# Patient Record
Sex: FEMALE | Race: BLACK OR AFRICAN AMERICAN | Employment: UNEMPLOYED | URBAN - METROPOLITAN AREA
[De-identification: names, ages, dates, MRNs, and addresses within clinical notes are randomized per-mention and may not be internally consistent; named-entity substitution may affect disease eponyms.]

---

## 2021-10-21 RX ORDER — ATORVASTATIN CALCIUM 20 MG/1
20 TABLET, FILM COATED ORAL DAILY
COMMUNITY

## 2021-10-21 NOTE — PROGRESS NOTES
Name_______________________________________Printed:____________________  Date and time of surgery________10/22/21 1300________________Ar[place] Time:____[facility]   1. The instructions given regarding when and if a patient needs to stop oral intake prior to surgery varies. Follow the specific instructions you were given                  __x_Nothing to eat or to drink after Midnight the night before.                   ____Carbo loading or ERAS instructions will be given to select patients-if you have been given those instructions -please do the following                           The evening before your surgery after dinner before midnight drink 40 ounces of gatorade. If you are diabetic use sugar free. The morning of surgery drink 40 ounces of water. This needs to be finished 3 hours prior to your surgery start time. 2. Take the following pills with a small sip of water on the morning of surgery____________xanax prn_______________________________________                  Do not take blood pressure medications ending in pril or sartan the kimberly prior to surgery or the morning of surgery_   3. Aspirin, Ibuprofen, Advil, Naproxen, Vitamin E and other Anti-inflammatory products and supplements should be stopped for 5 -7days before surgery or as directed by your physician. 4. Check with your Doctor regarding stopping Plavix, Coumadin,Eliquis, Lovenox,Effient,Pradaxa,Xarelto, Fragmin or other blood thinners and follow their instructions. 5. Do not smoke, and do not drink any alcoholic beverages 24 hours prior to surgery. This includes NA Beer. Refrain from the usage of any recreational drugs. 6. You may brush your teeth and gargle the morning of surgery. DO NOT SWALLOW WATER   7. You MUST make arrangements for a responsible adult to stay on site while you are here and take you home after your surgery. You will not be allowed to leave alone or drive yourself home.   It is strongly suggested someone stay with additional information:  Innofidei/patient-eprep              20.During flu season no children under the age of 15 are permitted in the hospital for the safety of all patients. 21. If you take a long acting insulin in the evening only  take half of your usual  dose the night  before your procedure              22. If you use a c-pap please bring DOS if staying overnight,             23.For your convenience [facility] has a pharmacy on site to fill your prescriptions. 24. If you use oxygen and have a portable tank please bring it  with you the DOS             25. Bring a complete list of all your medications with name and dose include any supplements. 26. Other__________________________________________   *Please call pre admission testing if you any further questions   Jax Wiseman   [address]    [place] [address] [place]  [phone]   [facility] [address] MONA WILEY   [phone]           COVID TESTING    ___ Covid test to be done 3-5 days prior to scheduled surgery -patient aware they are REQUIRED to bring a copy of the negative result DOS-if they receive a positive result to notify their surgeon         If known - indicate where patient is getting covid test done ___________________________________________________________    ___ [facility] - DOS    __x_ Other_____+covid 8/7/21 c.e.______________________________      Kayleen Blind POLICY(subject to change)    There is a one visitor policy at [facility] for all surgeries and endoscopies. Whether the visitor can stay or will be asked to wait in the car will depend on the current policy and if social distancing can be maintained. The policy is subject to change at any time. Please make sure the visitor has a cell phone that is on,charged and able to accept calls, as this may be the way that the staff communicates with them. Pain management is NO VISITOR policyThe patients ride is expected to remain in the car with a cell phone for communication. If the ride is leaving the hospital grounds please make sure they are back in time for pickup. Have the patient inform the staff on arrival what their rides plans are while the patient is in the facility. At the MAIN there is one visitor allowed. Please note that the visitor policy is subject to change. All above information reviewed with patient in person or by phone. Patient verbalizes understanding. All questions and concerns addressed.                                                                                                  Patient/Rep_______pt_____________                                                                                                                                    PRE OP INSTRUCTIONS

## 2021-10-22 ENCOUNTER — HOSPITAL ENCOUNTER (OUTPATIENT)
Age: 51
Setting detail: OUTPATIENT SURGERY
Discharge: HOME | End: 2021-10-22
Attending: PODIATRIST | Admitting: PODIATRIST
Payer: MEDICAID

## 2021-10-22 ENCOUNTER — ANESTHESIA (OUTPATIENT)
Dept: OPERATING ROOM | Age: 51
End: 2021-10-22
Payer: MEDICAID

## 2021-10-22 ENCOUNTER — ANESTHESIA EVENT (OUTPATIENT)
Dept: OPERATING ROOM | Age: 51
End: 2021-10-22
Payer: MEDICAID

## 2021-10-22 VITALS
HEART RATE: 81 BPM | RESPIRATION RATE: 16 BRPM | DIASTOLIC BLOOD PRESSURE: 85 MMHG | HEIGHT: 67 IN | WEIGHT: 180 LBS | OXYGEN SATURATION: 100 % | TEMPERATURE: 96.4 F | SYSTOLIC BLOOD PRESSURE: 142 MMHG | BODY MASS INDEX: 28.25 KG/M2

## 2021-10-22 VITALS
RESPIRATION RATE: 20 BRPM | OXYGEN SATURATION: 99 % | SYSTOLIC BLOOD PRESSURE: 111 MMHG | DIASTOLIC BLOOD PRESSURE: 78 MMHG

## 2021-10-22 DIAGNOSIS — G89.18 POST-OPERATIVE PAIN: Primary | ICD-10-CM

## 2021-10-22 LAB
GLUCOSE BLD-MCNC: 135 MG/DL (ref 70–99)
HCG(URINE) PREGNANCY TEST: NEGATIVE
PERFORMED ON: ABNORMAL

## 2021-10-22 PROCEDURE — 7100000000 HC PACU RECOVERY - FIRST 15 MIN: Performed by: PODIATRIST

## 2021-10-22 PROCEDURE — 3600000014 HC SURGERY LEVEL 4 ADDTL 15MIN: Performed by: PODIATRIST

## 2021-10-22 PROCEDURE — 7100000001 HC PACU RECOVERY - ADDTL 15 MIN: Performed by: PODIATRIST

## 2021-10-22 PROCEDURE — 2709999900 HC NON-CHARGEABLE SUPPLY: Performed by: PODIATRIST

## 2021-10-22 PROCEDURE — 3600000013 HC SURGERY LEVEL 3 ADDTL 15MIN: Performed by: PODIATRIST

## 2021-10-22 PROCEDURE — 3600000003 HC SURGERY LEVEL 3 BASE: Performed by: PODIATRIST

## 2021-10-22 PROCEDURE — C1713 ANCHOR/SCREW BN/BN,TIS/BN: HCPCS | Performed by: PODIATRIST

## 2021-10-22 PROCEDURE — 2500000003 HC RX 250 WO HCPCS: Performed by: REGISTERED NURSE

## 2021-10-22 PROCEDURE — 6360000002 HC RX W HCPCS: Performed by: REGISTERED NURSE

## 2021-10-22 PROCEDURE — 7100000010 HC PHASE II RECOVERY - FIRST 15 MIN: Performed by: PODIATRIST

## 2021-10-22 PROCEDURE — 7100000011 HC PHASE II RECOVERY - ADDTL 15 MIN: Performed by: PODIATRIST

## 2021-10-22 PROCEDURE — 84703 CHORIONIC GONADOTROPIN ASSAY: CPT

## 2021-10-22 PROCEDURE — 6360000002 HC RX W HCPCS: Performed by: PODIATRIST

## 2021-10-22 PROCEDURE — 3700000001 HC ADD 15 MINUTES (ANESTHESIA): Performed by: PODIATRIST

## 2021-10-22 PROCEDURE — 2580000003 HC RX 258: Performed by: REGISTERED NURSE

## 2021-10-22 PROCEDURE — 3700000000 HC ANESTHESIA ATTENDED CARE: Performed by: PODIATRIST

## 2021-10-22 PROCEDURE — 2500000003 HC RX 250 WO HCPCS: Performed by: PODIATRIST

## 2021-10-22 PROCEDURE — 2580000003 HC RX 258: Performed by: PODIATRIST

## 2021-10-22 PROCEDURE — 3600000004 HC SURGERY LEVEL 4 BASE: Performed by: PODIATRIST

## 2021-10-22 DEVICE — C WIRE FIX L5IN DIA0.045IN SPADE TIP FOR SM BNE TRAUM: Type: IMPLANTABLE DEVICE | Site: FOOT | Status: FUNCTIONAL

## 2021-10-22 RX ORDER — MIDAZOLAM HYDROCHLORIDE 1 MG/ML
INJECTION INTRAMUSCULAR; INTRAVENOUS PRN
Status: DISCONTINUED | OUTPATIENT
Start: 2021-10-22 | End: 2021-10-22 | Stop reason: SDUPTHER

## 2021-10-22 RX ORDER — ACETAMINOPHEN 650 MG
TABLET, EXTENDED RELEASE ORAL
Status: COMPLETED | OUTPATIENT
Start: 2021-10-22 | End: 2021-10-22

## 2021-10-22 RX ORDER — ONDANSETRON 2 MG/ML
4 INJECTION INTRAMUSCULAR; INTRAVENOUS
Status: DISCONTINUED | OUTPATIENT
Start: 2021-10-22 | End: 2021-10-22 | Stop reason: HOSPADM

## 2021-10-22 RX ORDER — LABETALOL HYDROCHLORIDE 5 MG/ML
5 INJECTION, SOLUTION INTRAVENOUS EVERY 10 MIN PRN
Status: DISCONTINUED | OUTPATIENT
Start: 2021-10-22 | End: 2021-10-22 | Stop reason: HOSPADM

## 2021-10-22 RX ORDER — BUPIVACAINE HYDROCHLORIDE 5 MG/ML
INJECTION, SOLUTION EPIDURAL; INTRACAUDAL
Status: COMPLETED | OUTPATIENT
Start: 2021-10-22 | End: 2021-10-22

## 2021-10-22 RX ORDER — ONDANSETRON 2 MG/ML
INJECTION INTRAMUSCULAR; INTRAVENOUS PRN
Status: DISCONTINUED | OUTPATIENT
Start: 2021-10-22 | End: 2021-10-22 | Stop reason: SDUPTHER

## 2021-10-22 RX ORDER — LIDOCAINE HYDROCHLORIDE 10 MG/ML
1 INJECTION, SOLUTION EPIDURAL; INFILTRATION; INTRACAUDAL; PERINEURAL
Status: CANCELLED | OUTPATIENT
Start: 2021-10-22 | End: 2021-10-22

## 2021-10-22 RX ORDER — PROPOFOL 10 MG/ML
INJECTION, EMULSION INTRAVENOUS PRN
Status: DISCONTINUED | OUTPATIENT
Start: 2021-10-22 | End: 2021-10-22 | Stop reason: SDUPTHER

## 2021-10-22 RX ORDER — SODIUM CHLORIDE, SODIUM LACTATE, POTASSIUM CHLORIDE, CALCIUM CHLORIDE 600; 310; 30; 20 MG/100ML; MG/100ML; MG/100ML; MG/100ML
INJECTION, SOLUTION INTRAVENOUS CONTINUOUS
Status: CANCELLED | OUTPATIENT
Start: 2021-10-22

## 2021-10-22 RX ORDER — SODIUM CHLORIDE 0.9 % (FLUSH) 0.9 %
10 SYRINGE (ML) INJECTION EVERY 12 HOURS SCHEDULED
Status: CANCELLED | OUTPATIENT
Start: 2021-10-22

## 2021-10-22 RX ORDER — FENTANYL CITRATE 50 UG/ML
INJECTION, SOLUTION INTRAMUSCULAR; INTRAVENOUS PRN
Status: DISCONTINUED | OUTPATIENT
Start: 2021-10-22 | End: 2021-10-22 | Stop reason: SDUPTHER

## 2021-10-22 RX ORDER — SODIUM CHLORIDE 9 MG/ML
INJECTION, SOLUTION INTRAVENOUS CONTINUOUS
Status: DISCONTINUED | OUTPATIENT
Start: 2021-10-22 | End: 2021-10-22 | Stop reason: HOSPADM

## 2021-10-22 RX ORDER — OXYCODONE HYDROCHLORIDE AND ACETAMINOPHEN 5; 325 MG/1; MG/1
1 TABLET ORAL EVERY 4 HOURS PRN
Qty: 42 TABLET | Refills: 0 | Status: SHIPPED | OUTPATIENT
Start: 2021-10-22 | End: 2021-10-29

## 2021-10-22 RX ORDER — PROMETHAZINE HYDROCHLORIDE 25 MG/1
25 TABLET ORAL EVERY 6 HOURS PRN
Qty: 28 TABLET | Refills: 0 | Status: SHIPPED | OUTPATIENT
Start: 2021-10-22 | End: 2021-10-29

## 2021-10-22 RX ORDER — PROPOFOL 10 MG/ML
INJECTION, EMULSION INTRAVENOUS CONTINUOUS PRN
Status: DISCONTINUED | OUTPATIENT
Start: 2021-10-22 | End: 2021-10-22 | Stop reason: SDUPTHER

## 2021-10-22 RX ORDER — LIDOCAINE HYDROCHLORIDE 20 MG/ML
INJECTION, SOLUTION EPIDURAL; INFILTRATION; INTRACAUDAL; PERINEURAL PRN
Status: DISCONTINUED | OUTPATIENT
Start: 2021-10-22 | End: 2021-10-22 | Stop reason: SDUPTHER

## 2021-10-22 RX ORDER — MEPERIDINE HYDROCHLORIDE 25 MG/ML
12.5 INJECTION INTRAMUSCULAR; INTRAVENOUS; SUBCUTANEOUS EVERY 5 MIN PRN
Status: DISCONTINUED | OUTPATIENT
Start: 2021-10-22 | End: 2021-10-22 | Stop reason: HOSPADM

## 2021-10-22 RX ORDER — SODIUM CHLORIDE 9 MG/ML
25 INJECTION, SOLUTION INTRAVENOUS PRN
Status: CANCELLED | OUTPATIENT
Start: 2021-10-22

## 2021-10-22 RX ORDER — SODIUM CHLORIDE 0.9 % (FLUSH) 0.9 %
10 SYRINGE (ML) INJECTION PRN
Status: CANCELLED | OUTPATIENT
Start: 2021-10-22

## 2021-10-22 RX ORDER — OXYCODONE HYDROCHLORIDE AND ACETAMINOPHEN 5; 325 MG/1; MG/1
1 TABLET ORAL
Status: DISCONTINUED | OUTPATIENT
Start: 2021-10-22 | End: 2021-10-22 | Stop reason: HOSPADM

## 2021-10-22 RX ORDER — HYDROMORPHONE HCL 110MG/55ML
0.5 PATIENT CONTROLLED ANALGESIA SYRINGE INTRAVENOUS EVERY 5 MIN PRN
Status: DISCONTINUED | OUTPATIENT
Start: 2021-10-22 | End: 2021-10-22 | Stop reason: HOSPADM

## 2021-10-22 RX ORDER — HYDRALAZINE HYDROCHLORIDE 20 MG/ML
5 INJECTION INTRAMUSCULAR; INTRAVENOUS EVERY 10 MIN PRN
Status: DISCONTINUED | OUTPATIENT
Start: 2021-10-22 | End: 2021-10-22 | Stop reason: HOSPADM

## 2021-10-22 RX ORDER — LIDOCAINE HYDROCHLORIDE 10 MG/ML
0.5 INJECTION, SOLUTION EPIDURAL; INFILTRATION; INTRACAUDAL; PERINEURAL ONCE
Status: DISCONTINUED | OUTPATIENT
Start: 2021-10-22 | End: 2021-10-22 | Stop reason: HOSPADM

## 2021-10-22 RX ORDER — LIDOCAINE HYDROCHLORIDE 10 MG/ML
INJECTION, SOLUTION EPIDURAL; INFILTRATION; INTRACAUDAL; PERINEURAL
Status: COMPLETED | OUTPATIENT
Start: 2021-10-22 | End: 2021-10-22

## 2021-10-22 RX ORDER — SODIUM CHLORIDE 9 MG/ML
INJECTION, SOLUTION INTRAVENOUS CONTINUOUS PRN
Status: DISCONTINUED | OUTPATIENT
Start: 2021-10-22 | End: 2021-10-22 | Stop reason: SDUPTHER

## 2021-10-22 RX ADMIN — MIDAZOLAM 2 MG: 1 INJECTION INTRAMUSCULAR; INTRAVENOUS at 12:52

## 2021-10-22 RX ADMIN — ONDANSETRON 4 MG: 2 INJECTION INTRAMUSCULAR; INTRAVENOUS at 13:38

## 2021-10-22 RX ADMIN — SODIUM CHLORIDE: 9 INJECTION, SOLUTION INTRAVENOUS at 12:53

## 2021-10-22 RX ADMIN — LIDOCAINE HYDROCHLORIDE 80 MG: 20 INJECTION, SOLUTION EPIDURAL; INFILTRATION; INTRACAUDAL; PERINEURAL at 12:57

## 2021-10-22 RX ADMIN — FENTANYL CITRATE 25 MCG: 50 INJECTION, SOLUTION INTRAMUSCULAR; INTRAVENOUS at 13:09

## 2021-10-22 RX ADMIN — PROPOFOL 80 MG: 10 INJECTION, EMULSION INTRAVENOUS at 12:57

## 2021-10-22 RX ADMIN — PROPOFOL 120 MCG/KG/MIN: 10 INJECTION, EMULSION INTRAVENOUS at 12:57

## 2021-10-22 RX ADMIN — CEFAZOLIN 2000 MG: 10 INJECTION, POWDER, FOR SOLUTION INTRAVENOUS at 12:47

## 2021-10-22 RX ADMIN — FENTANYL CITRATE 25 MCG: 50 INJECTION, SOLUTION INTRAMUSCULAR; INTRAVENOUS at 13:14

## 2021-10-22 RX ADMIN — SODIUM CHLORIDE: 9 INJECTION, SOLUTION INTRAVENOUS at 12:32

## 2021-10-22 ASSESSMENT — PULMONARY FUNCTION TESTS
PIF_VALUE: 0
PIF_VALUE: 1
PIF_VALUE: 0
PIF_VALUE: 1
PIF_VALUE: 0

## 2021-10-22 ASSESSMENT — PAIN - FUNCTIONAL ASSESSMENT
PAIN_FUNCTIONAL_ASSESSMENT: 0-10
PAIN_FUNCTIONAL_ASSESSMENT: PREVENTS OR INTERFERES SOME ACTIVE ACTIVITIES AND ADLS

## 2021-10-22 ASSESSMENT — PAIN DESCRIPTION - DESCRIPTORS: DESCRIPTORS: ACHING;BURNING

## 2021-10-22 ASSESSMENT — LIFESTYLE VARIABLES: SMOKING_STATUS: 0

## 2021-10-22 NOTE — PROGRESS NOTES
Pt arrived from OR, report from crna/rn. Pt had ORIF left 4th toe, dressing/ace wrap CDI. Pt awake and alert upon arrival, VSS. Respirations even unlabored and adequate on room air. Left toes warm, kirstin color, less 3 second cap refill. Pt has no complaints of pain.

## 2021-10-22 NOTE — PROGRESS NOTES
CLINICAL PHARMACY NOTE: MEDS TO BEDS    Total # of Prescriptions Filled: 2   The following medications were delivered to the patient:  · Percocet 5-325 mg  · Promethazine 25 mg    Additional Documentation:    Delivered to Patient friend Araecly Osman =signed  Max Lea CPhT

## 2021-10-22 NOTE — BRIEF OP NOTE
Brief Postoperative Note      Patient: Terry Singletary  YOB: 1970  MRN: [id number]    Date of Procedure: 10/22/2021    Pre-Op Diagnosis: S92.512D  DISPLACED FRACTURE 4TH PROXIMAL PHALANX, LEFT    Post-Op Diagnosis: Same       Procedure(s):  OPEN REDUCTION INTERNAL FIXATION LEFT FOURTH TOE    Surgeon(s):  Sedrick Grossman DPM    Assistant:  Leisa Page DPM, PGY-2    Anesthesia: Monitor Anesthesia Care    Injectables: 10cc of 1% lidocaine plain preoperatively  10cc of 0.5% marcaine plain intra-operatively    Hemostasis: Pneumatic ankle tourniquet at 250mmHg for 46 minutes    Materials: 0.045 inch K-wire  3-0 vicryl, 5-0 vicryl    Estimated Blood Loss (mL): Minimal    Complications: None    Specimens:   * No specimens in log *    Implants:  Implant Name Type Inv.  Item Serial No.  Lot No. LRB No. Used Action   C WIRE FIX L5IN DIA0.045IN SPADE TIP FOR SM BNE TRAUM  C WIRE FIX L5IN DIA0.045IN SPADE TIP FOR SM BNE TRAUM  [facility] [employer] [facility] HARR[employer]WD [id number] Left 1 Implanted         Drains: * No LDAs found *    Findings: See op report    Electronically signed by Leisa Page DPM on 10/22/2021 at 2:04 PM

## 2021-10-22 NOTE — H&P
Department of Podiatric Surgery  History and Physical      HISTORY OF PRESENT ILLNESS:      The patient is a 45 y.o. female presents today for operative correction of displaced fracture of 4th proximal phalanx, left. Past Medical History:        Diagnosis Date    Anxiety     Depression     Diabetes mellitus ([address]     BORDERLINE    Hypertension        Past Surgical History:        Procedure Laterality Date    COLONOSCOPY      BENIGN POLYPS X 2    CYST REMOVAL      SHOULDER SURGERY      UPPER GASTROINTESTINAL ENDOSCOPY                   Medications Prior to Admission:   Medications Prior to Admission: METFORMIN HCL PO, Take by mouth  CHLORTHALIDONE PO, Take by mouth  atorvastatin (LIPITOR) 20 MG tablet, Take 20 mg by mouth daily  ondansetron (ZOFRAN ODT) 4 MG disintegrating tablet, Take 1-2 tablets by mouth every 12 hours as needed for Nausea. cephALEXin (KEFLEX) 500 MG capsule, Take 1 capsule by mouth 4 times daily. zolpidem (AMBIEN) 5 MG tablet, Take 5 mg by mouth nightly as needed. niacin 500 MG CR capsule, Take 500 mg by mouth nightly. Omega 3 1000 MG CAPS, Take  by mouth daily. vitamin D (ERGOCALCIFEROL) 46458 UNITS CAPS capsule, Take 50,000 Units by mouth once a week. alprazolam (XANAX) 0.5 MG tablet, Take 0.5 mg by mouth 3 times daily as needed. therapeutic multivitamin-minerals (THERAGRAN-M) tablet, Take 1 tablet by mouth daily. Allergies:  Lisinopril    Social History:   TOBACCO:   reports that she has never smoked. She has never used smokeless tobacco.  ETOH:   reports current alcohol use. DRUGS:   reports previous drug use. Family History:   History reviewed. No pertinent family history.     REVIEW OF SYSTEMS:  CONSTITUTIONAL:  negative  RESPIRATORY:  negative  CARDIOVASCULAR:  negative  GASTROINTESTINAL:  negative  MUSCULOSKELETAL:  negative  NEUROLOGICAL:  negative    PHYSICAL EXAM:  VITALS:  /81   Pulse 88   Temp 98.1 °F (36.7 °C) (Temporal)   Resp 16   Ht 5' 7.25\" (1.708 m)   Wt 180 lb (81.6 kg)   LMP 02/21/2021   SpO2 99%   Breastfeeding No   BMI 27.98 kg/m²   CONSTITUTIONAL:  awake, alert, cooperative, no apparent distress, and appears stated age  EYES:  pupils equal, round and reactive to light, extra ocular muscles intact, sclera clear, conjunctiva normal  ENT:  normocepalic, without obvious abnormality  NECK:  Supple, symmetrical, trachea midline, no adenopathy, thyroid symmetric, not enlarged and no tenderness, skin normal  LUNGS:  No increased work of breathing, good air exchange, clear to auscultation bilaterally, no crackles or wheezing  CARDIOVASCULAR:  Normal apical impulse, regular rate and rhythm  ABDOMEN:  normal bowel sounds, soft, non-distended, non-tender, no masses palpated, no hepatosplenomegally      ASSESSMENT AND PLAN:  1.  Displaced fracture of the 4th proximal phalanx, left    - Plan for open reduction with internal fixation of the left 4th toe      Mortimer Poland, DPM  [phone]

## 2021-10-22 NOTE — PROGRESS NOTES
Surgical shoe placed on left foot. Pt assisted with dressing and ambulation into wheel chair. Pt left foot dressing clean dry and intact. Left toes warm, and less 3 second cap refill. Pt has no complaints of pain or nausea, tolerating PO well. Pt discharged via wheel chair in stable condition to daughter in law to be transported home.

## 2021-10-22 NOTE — ANESTHESIA PRE PROCEDURE
Department of Anesthesiology  Preprocedure Note       Name:  Aleksander Barbour   Age:  45 y.o.  :  1970                                          MRN:  [id number]         Date:  10/22/2021      Surgeon: Joy Baltazar):  Beth Paez DPM    Procedure: Procedure(s):  OPEN REDUCTION INTERNAL FIXATION LEFT FOURTH TOE    Medications prior to admission:   Prior to Admission medications    Medication Sig Start Date End Date Taking? Authorizing Provider   METFORMIN HCL PO Take by mouth   Yes Historical Provider, MD   CHLORTHALIDONE PO Take by mouth   Yes Historical Provider, MD   atorvastatin (LIPITOR) 20 MG tablet Take 20 mg by mouth daily   Yes Historical Provider, MD   ondansetron (ZOFRAN ODT) 4 MG disintegrating tablet Take 1-2 tablets by mouth every 12 hours as needed for Nausea. 14   [facility] BLESSING Goodson PA-C   cephALEXin (KEFLEX) 500 MG capsule Take 1 capsule by mouth 4 times daily. 14   ÁNGELA Wahl   zolpidem (AMBIEN) 5 MG tablet Take 5 mg by mouth nightly as needed. Historical Provider, MD   niacin 500 MG CR capsule Take 500 mg by mouth nightly. Historical Provider, MD   Omega 3 1000 MG CAPS Take  by mouth daily. Historical Provider, MD   vitamin D (ERGOCALCIFEROL) 40079 UNITS CAPS capsule Take 50,000 Units by mouth once a week. Historical Provider, MD   alprazolam Tom Files) 0.5 MG tablet Take 0.5 mg by mouth 3 times daily as needed. Historical Provider, MD   therapeutic multivitamin-minerals (THERAGRAN-M) tablet Take 1 tablet by mouth daily.       Historical Provider, MD       Current medications:    Current Facility-Administered Medications   Medication Dose Route Frequency Provider Last Rate Last Admin    lidocaine PF 1 % injection 0.5 mL  0.5 mL SubCUTAneous Once Beth Paez DPM        0.9 % sodium chloride infusion   IntraVENous Continuous Beth Paez DPM        ceFAZolin (ANCEF) 2000 mg in dextrose 5 % 50 mL IVPB  2,000 mg IntraVENous On Call to [address], [place]  meperidine (DEMEROL) injection 12.5 mg  12.5 mg IntraVENous Q5 Min PRN Timbo Botello MD        HYDROmorphone (DILAUDID) injection 0.5 mg  0.5 mg IntraVENous Q5 Min PRN Timbo Botello MD        oxyCODONE-acetaminophen (PERCOCET) 5-325 MG per tablet 1 tablet  1 tablet Oral Once PRN Timbo Botello MD        ondansetron [facility]) injection 4 mg  4 mg IntraVENous Once PRN Timbo Botello MD        labetalol (NORMODYNE;TRANDATE) injection 5 mg  5 mg IntraVENous Q10 Min PRN Timbo Botello MD        hydrALAZINE (APRESOLINE) injection 5 mg  5 mg IntraVENous Q10 Min PRN Timbo Botello MD           Allergies: Allergies   Allergen Reactions    Lisinopril Swelling       Problem List:  There is no problem list on file for this patient. Past Medical History:        Diagnosis Date    Anxiety     Depression     Diabetes mellitus [address] [address]     BORDERLINE    Hypertension        Past Surgical History:        Procedure Laterality Date    COLONOSCOPY      BENIGN POLYPS X 2    CYST REMOVAL      SHOULDER SURGERY      UPPER GASTROINTESTINAL ENDOSCOPY         Social History:    Social History     Tobacco Use    Smoking status: Never Smoker    Smokeless tobacco: Never Used   Substance Use Topics    Alcohol use: Yes     Comment: social                                Counseling given: Not Answered      Vital Signs (Current):   Vitals:    10/21/21 1017   Weight: 182 lb (82.6 kg)   Height: 5' 7\" (1.702 m)                                              BP Readings from Last 3 Encounters:   06/22/11 132/82       NPO Status:                                                                                 BMI:   Wt Readings from Last 3 Encounters:   10/21/21 182 lb (82.6 kg)   06/22/11 145 lb (65.8 kg)     Body mass index is 28.51 kg/m².     CBC:   Lab Results   Component Value Date    WBC 7.4 05/31/2014    RBC 5.19 05/31/2014    HGB 13.7 05/31/2014    HCT 42.6 05/31/2014    MCV 82.1 05/31/2014    RDW 14.6 05/31/2014     05/31/2014       CMP:   Lab Results   Component Value Date     05/31/2014    K 5.0 05/31/2014    CL 97 05/31/2014    CO2 25 05/31/2014    BUN 6 05/31/2014    CREATININE 0.6 05/31/2014    GFRAA >60 05/31/2014    GFRAA >60 02/18/2013    AGRATIO 1.8 02/18/2013    LABGLOM >60 05/31/2014    GLUCOSE 135 05/31/2014    PROT 6.8 02/18/2013    CALCIUM 9.3 05/31/2014    BILITOT 0.40 02/18/2013    ALKPHOS 98 02/18/2013    AST 19 02/18/2013    ALT 13 02/18/2013       POC Tests: No results for input(s): POCGLU, POCNA, POCK, POCCL, POCBUN, POCHEMO, POCHCT in the last 72 hours. Coags: No results found for: PROTIME, INR, APTT    HCG (If Applicable):   Lab Results   Component Value Date    PREGTESTUR Negative 05/31/2014        ABGs: No results found for: PHART, PO2ART, JKV4CNS, QIM9HXC, BEART, K8YCMOYM     Type & Screen (If Applicable):  No results found for: LABABO, LABRH    Drug/Infectious Status (If Applicable):  No results found for: HIV, HEPCAB    COVID-19 Screening (If Applicable): No results found for: COVID19        Anesthesia Evaluation  Patient summary reviewed and Nursing notes reviewed no history of anesthetic complications:   Airway: Mallampati: II  TM distance: >3 FB   Neck ROM: full  Mouth opening: > = 3 FB Dental: normal exam         Pulmonary:normal exam  breath sounds clear to auscultation      (-) COPD, asthma, sleep apnea and not a current smoker                           Cardiovascular:    (+) hypertension:, hyperlipidemia    (-) past MI, CAD, CABG/stent, dysrhythmias,  angina and  CHF    ECG reviewed  Rhythm: regular  Rate: normal                    Neuro/Psych:   (+) depression/anxiety    (-) seizures, TIA and CVA           GI/Hepatic/Renal:        (-) GERD, liver disease and no renal disease       Endo/Other:    (+) Diabetes (a1c 6.7)Type II DM, , .    (-) hypothyroidism, hyperthyroidism               Abdominal:             Vascular:           Other Findings:             Anesthesia Plan      MAC     ASA 3       Induction: intravenous. Anesthetic plan and risks discussed with patient. Plan discussed with CRNA.                   Javad Navarro MD   10/22/2021

## 2021-10-22 NOTE — PROGRESS NOTES
Pt daughter in law brought back to bedside to review discharge instructions. D/c instructions reviewed, all questions answered and verbalized understanding of instructions. Script filled and brought to daughter in law per outpt pharmacy. Pt sitting up in bed, no complaints of nausea or pain, tolerating PO fluids well.

## 2021-10-22 NOTE — OP NOTE
OPERATIVE REPORT    Los Woodward  [id number]  YOB: 1970    Surgeon: Gardenia Cheung DPM  Assistant: Mar Neves, PGY-2  Pre-operative Diagnosis: Displaced fracture, 4th proximal phalanx, left (S92.512D)  Post-operative Diagnosis: same  Procedure: Open reduction with internal fixation, left 4th toe (33399)  Pathology: none obtained  Anesthesia: MAC  Hemostasis: ankle tourniquet, total time 46 minutes  Est. Blood Loss: <15cc   Materials: 0.045\" K-wire  Injectables: 10cc of 1% Lidocaine plain; 10cc of 0.5% Marcaine plain    INDICATIONS FOR PROCEDURE: This patient has signs and symptoms clinically  consistent with the above mentioned preoperative diagnosis of displaced fracture of the 4th toe. Having failed conservative treatment, including surgical shoe, it was determined that the patient would benefit from surgical intervention. All potential risks, benefits, and complications were discussed with the patient prior to the scheduling of surgery. All the patient's questions were answered and no guarantees were given. The patient wished to proceed with surgery, and informed written consent was obtained. DETAILS OF PROCEDURE: The patient was brought from the pre-operative area and placed on the operating table in the supine position. A pneumatic ankle tourniquet was placed around the patient's well-padded left lower extremity. Following IV sedation, a local anesthetic block was then injected proximal to the incision site consisting of 10cc of 1% lidocaine plain. The left  lower extremity was then scrubbed, prepped, and draped in the usual sterile fashion. A time-out was performed. The patient, procedure, and operative site were confirmed. An Esmarch bandage was then utilized to exsanguinate the patient's left lower extremity. The tourniquet was then inflated to 250 mmHg and the following procedure was performed.     Attention was directed to the 4th digit of the left foot, where using a #15 blade, a full thickness skin incision was created over the proximal phalanx. Using both sharp and blunt dissection, the incision was deepened through subcutaneous tissue with care being taken to retract and avoid all vital neurovascular structures. Next, the extensor tendon was carefully freed to the lateral aspect and retracted medially. Next, a full-thickness linear periosteal incision was created overlying the proximal phalanx. The periosteum was reflected both medially and laterally thereby exposing the proximal phalanx and the underlying fracture. The fracture was carefully debrided of interposing soft tissue and care was directed at fixation. Using a 0.045\" K-wire, the distal and intermediate phalanges were engaged from a distal to proximal aspect down to medullary canal. Next, using pick ups, the proximal phalanx fracture was carefully reducted, and the wire was driven in a retrograde fashion down through the proximal phalangeal head, crossing the fracture, and engaging the remaining proximal phalanx, stopping just proximal to the head of the metatarsal. Fluoroscopy on all views was utilized to confirm adequate reduction of the fracture, good compression at the site, and good position of the hardware. The K-wire was then bent and cut and a small ball was placed overtop for protection. The wound was flushed with copious amounts of normal saline. The deep periosteal structures were re-approximated using 3-0 vicryl. The subcutaneous structures were re-approximated using 4-0 vicryl. The skin was re-approximated using 5-0 vicryl in a running intradermal fashion. The incision was covered in mastisol and steristrips and painted with betadine. At this time, a local anesthetic was injected about the incision sites consisting of 10cc of 0.5% Marcaine plain, for the patient's postoperative comfort. A soft sterile dressing was applied consisting of gauze, vero, webroll, and ace.  The pneumatic ankle tourniquet was rapidly deflated after a total time of 46 minutes and a prompt hyperemic response was noted on all aspects of the patient's left lower extremity. END OF PROCEDURE: The patient tolerated the procedure and anesthesia well and was transported from the operating room to the PACU with vital signs stable and vascular status intact to all aspects of the patient's left lower extremity and digital capillary refill time immediate to the digits of the left  foot. Following a period of post-operative monitoring, the patient will be discharged home with written and oral wound care and follow-up instructions. The patient is to follow-up with me in my private office within 3-5 days. The patient is to keep dressing clean, dry and intact at all times. The patient is to call with if any complications occur.     Bee Valverde DPM  [phone]

## (undated) DEVICE — SUTURE VCRL + SZ 3-0 L18IN ABSRB UD PS-2 3/8 CIR REV CUT VCP497H

## (undated) DEVICE — INTENDED FOR TISSUE SEPARATION, AND OTHER PROCEDURES THAT REQUIRE A SHARP SURGICAL BLADE TO PUNCTURE OR CUT.: Brand: BARD-PARKER ® STAINLESS STEEL BLADES

## (undated) DEVICE — TOWEL,OR,DSP,ST,BLUE,STD,4/PK,20PK/CS: Brand: MEDLINE

## (undated) DEVICE — T-DRAPE,EXTREMITY,STERILE: Brand: MEDLINE

## (undated) DEVICE — HYPODERMIC SAFETY NEEDLE: Brand: MAGELLAN

## (undated) DEVICE — C-ARM: Brand: UNBRANDED

## (undated) DEVICE — STOCKINETTE,IMPERVIOUS,12X48,STERILE: Brand: MEDLINE

## (undated) DEVICE — GLOVE ORANGE PI 7   MSG9070

## (undated) DEVICE — MASTISOL ADHESIVE LIQ 2/3ML

## (undated) DEVICE — PRECISION THIN (5.5 X 0.38 X 18.0MM)

## (undated) DEVICE — 12 ML SYRINGE,LUER-LOCK TIP: Brand: MONOJECT

## (undated) DEVICE — BNDG,ELSTC,MATRIX,STRL,4"X5YD,LF,HOOK&LP: Brand: MEDLINE

## (undated) DEVICE — PACK PROCEDURE SURG EXTREMITY MFFOP CUST

## (undated) DEVICE — PADDING CAST W4INXL4YD ST COT RAYON MICROPLEATED HIGHLY

## (undated) DEVICE — GLOVE SURG SZ 7 L12IN THK7.5MIL DK GRN LTX FREE MSG6570] MEDLINE INDUSTRIES INC]

## (undated) DEVICE — SUTURE COAT VCRL SZ 5-0 L18IN ABSRB UD L19MM PS-2 1/2 CIR J495G

## (undated) DEVICE — DRESSING PETRO W3XL3IN OIL EMUL N ADH GZ KNIT IMPREG CELOS

## (undated) DEVICE — MEDICINE CUP, GRADUATED, STER: Brand: MEDLINE

## (undated) DEVICE — GAUZE,SPONGE,4"X4",8PLY,STRL,LF,10/TRAY: Brand: MEDLINE

## (undated) DEVICE — STRIP,CLOSURE,WOUND,MEDI-STRIP,1/2X4: Brand: MEDLINE

## (undated) DEVICE — GLOVE ORANGE PI 8   MSG9080

## (undated) DEVICE — BANDAGE GZ W2XL75IN ST RAYON POLY CNFRM STRTCH LTWT

## (undated) DEVICE — BANDAGE COMPR W4INXL12FT E DISP ESMARCH EVEN

## (undated) DEVICE — SUTURE NONABSORBABLE MONOFILAMENT 4-0 PS-2 18 IN BLK ETHILON 1667G